# Patient Record
Sex: MALE | Race: WHITE | NOT HISPANIC OR LATINO | URBAN - METROPOLITAN AREA
[De-identification: names, ages, dates, MRNs, and addresses within clinical notes are randomized per-mention and may not be internally consistent; named-entity substitution may affect disease eponyms.]

---

## 2021-03-05 ENCOUNTER — LAB OUTSIDE AN ENCOUNTER (OUTPATIENT)
Dept: URBAN - METROPOLITAN AREA CLINIC 82 | Facility: CLINIC | Age: 61
End: 2021-03-05

## 2021-03-05 ENCOUNTER — OFFICE VISIT (OUTPATIENT)
Dept: URBAN - METROPOLITAN AREA CLINIC 82 | Facility: CLINIC | Age: 61
End: 2021-03-05
Payer: COMMERCIAL

## 2021-03-05 DIAGNOSIS — R19.7 DIARRHEA, UNSPECIFIED: ICD-10-CM

## 2021-03-05 DIAGNOSIS — K92.1 HEMATOCHEZIA: ICD-10-CM

## 2021-03-05 DIAGNOSIS — L98.9 SKIN LESIONS: ICD-10-CM

## 2021-03-05 DIAGNOSIS — R10.9 ABDOMINAL PAIN: ICD-10-CM

## 2021-03-05 DIAGNOSIS — R16.0 HEPATOMEGALY: ICD-10-CM

## 2021-03-05 DIAGNOSIS — R63.4 WEIGHT LOSS: ICD-10-CM

## 2021-03-05 PROCEDURE — G8420 CALC BMI NORM PARAMETERS: HCPCS | Performed by: INTERNAL MEDICINE

## 2021-03-05 PROCEDURE — G9902 PT SCRN TBCO AND ID AS USER: HCPCS | Performed by: INTERNAL MEDICINE

## 2021-03-05 PROCEDURE — 99214 OFFICE O/P EST MOD 30 MIN: CPT | Performed by: INTERNAL MEDICINE

## 2021-03-05 PROCEDURE — G8427 DOCREV CUR MEDS BY ELIG CLIN: HCPCS | Performed by: INTERNAL MEDICINE

## 2021-03-05 RX ORDER — DICYCLOMINE HYDROCHLORIDE 10 MG/1
1 TABLET CAPSULE ORAL THREE TIMES A DAY
Qty: 90 | Refills: 1 | OUTPATIENT
Start: 2021-03-05 | End: 2021-05-04

## 2021-03-05 RX ORDER — SODIUM, POTASSIUM,MAG SULFATES 17.5-3.13G
TAKE 325 ML SOLUTION, RECONSTITUTED, ORAL ORAL ONCE
Qty: 1 | Refills: 0 | OUTPATIENT
Start: 2021-03-05 | End: 2021-03-06

## 2021-03-05 RX ORDER — CHOLESTYRAMINE 4 G/9G
1 PACKET MIXED WITH WATER OR NON-CARBONATED DRINK POWDER, FOR SUSPENSION ORAL TWICE A DAY
Qty: 60 | Refills: 1 | OUTPATIENT
Start: 2021-03-05

## 2021-03-05 NOTE — HPI-OTHER HISTORIES
The patient is a 59 year old /White male, who presents on referral from Maegan Crockett MD, for a gastroenterology evaluation for liver concerns. A copy of this document will be sent to the referring provider. The patient reports no significant symptoms related to the elevated LFT's.  The patient relates no significant family or personal history of liver disease. He states a history of mild alcohol use. The patient reports a personal history of no other habits that could cause liver damage.  Interval testing has not been done.     01/08/2020 Patient states he went to his dermatologist to check blisters on his hands, and suspects it has to do with liver. Patient reports donating blood in the past in the 80's and was suspended from blood donation because he had antibodies. Denies any lesions on the legs. Denies any jaundice. Denies any history of hepatitis in the past.  01/27/2020 Labs showed anti-smooth muscle antibodies positive, negative Hepatitis panel. RUQ US done on 01/08/2020 showed hepatomegaly.  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 Denies any jaundice, ascites or pedal edema. Denies any abdominal pain. Still having lesion in left dorsal area of the hand.  02/20/2020 Liver biopsy done on 02/04/2020 showed minimal portal inflammation, no fatty liver, no fibrosis.  Patient denies any consumption of natural or herbal supplements. He has not seen the dermatologist. He states the lesions won't return until the summer time. He states he rarely drinks alcohol. He did have some pain after the liver biopsy at liver bx site. He reports taking Vitamin B12 shots. He has not has B12 level checked. Denies any melena or hematochezia.

## 2021-03-05 NOTE — HPI-TODAY'S VISIT:
03/05/2021 Patient presents on referral from Dr. Maegan Crockett for colon cancer screening. Colonoscopy in 2017 showed polyps in the ascending colon, transverse colon, and sigmoid colon. Advised him to have repeat colonoscopy in 3 years. Biopsy showed tubular adenomas. Patient c/o of abdominal pain for severl months, black stool. He currently takes Narco. No family history of colitis. Patient lost about 30lbs over past 6 months, but appetite is normal. His bowel movement occur 3-4 times a day, intermittent bloody stool. His mother passed away from stomach cancer which metastisized. Some tenderness in LUQ. His cardiologist doubled his Porotonix dosage.

## 2021-04-09 ENCOUNTER — OFFICE VISIT (OUTPATIENT)
Dept: URBAN - METROPOLITAN AREA SURGERY CENTER 13 | Facility: SURGERY CENTER | Age: 61
End: 2021-04-09
Payer: COMMERCIAL

## 2021-04-09 DIAGNOSIS — Z86.010 H/O ADENOMATOUS POLYP OF COLON: ICD-10-CM

## 2021-04-09 DIAGNOSIS — K22.2 ACQUIRED ESOPHAGEAL RING: ICD-10-CM

## 2021-04-09 DIAGNOSIS — K63.89 BACTERIAL OVERGROWTH SYNDROME: ICD-10-CM

## 2021-04-09 DIAGNOSIS — R63.4 ABNORMAL INTENTIONAL WEIGHT LOSS: ICD-10-CM

## 2021-04-09 DIAGNOSIS — K22.8 COLUMNAR-LINED ESOPHAGUS: ICD-10-CM

## 2021-04-09 DIAGNOSIS — R10.13 ABDOMINAL DISCOMFORT, EPIGASTRIC: ICD-10-CM

## 2021-04-09 DIAGNOSIS — K31.89 ACQUIRED DEFORMITY OF DUODENUM: ICD-10-CM

## 2021-04-09 PROCEDURE — 45380 COLONOSCOPY AND BIOPSY: CPT | Performed by: INTERNAL MEDICINE

## 2021-04-09 PROCEDURE — G8907 PT DOC NO EVENTS ON DISCHARG: HCPCS | Performed by: INTERNAL MEDICINE

## 2021-04-09 PROCEDURE — 43239 EGD BIOPSY SINGLE/MULTIPLE: CPT | Performed by: INTERNAL MEDICINE

## 2021-04-09 PROCEDURE — 43249 ESOPH EGD DILATION <30 MM: CPT | Performed by: INTERNAL MEDICINE

## 2021-05-10 ENCOUNTER — OFFICE VISIT (OUTPATIENT)
Dept: URBAN - NONMETROPOLITAN AREA CLINIC 4 | Facility: CLINIC | Age: 61
End: 2021-05-10

## 2021-05-10 RX ORDER — CHOLESTYRAMINE 4 G/9G
1 PACKET MIXED WITH WATER OR NON-CARBONATED DRINK POWDER, FOR SUSPENSION ORAL TWICE A DAY
Qty: 60 | Refills: 1 | COMMUNITY

## 2023-12-08 ENCOUNTER — OFFICE VISIT (OUTPATIENT)
Dept: URBAN - METROPOLITAN AREA CLINIC 54 | Facility: CLINIC | Age: 63
End: 2023-12-08
Payer: COMMERCIAL

## 2023-12-08 VITALS
WEIGHT: 157 LBS | HEIGHT: 72 IN | DIASTOLIC BLOOD PRESSURE: 90 MMHG | TEMPERATURE: 97.4 F | BODY MASS INDEX: 21.26 KG/M2 | HEART RATE: 64 BPM | SYSTOLIC BLOOD PRESSURE: 121 MMHG

## 2023-12-08 DIAGNOSIS — L98.9 SKIN LESIONS: ICD-10-CM

## 2023-12-08 DIAGNOSIS — R16.0 HEPATOMEGALY: ICD-10-CM

## 2023-12-08 DIAGNOSIS — R19.7 DIARRHEA, UNSPECIFIED: ICD-10-CM

## 2023-12-08 DIAGNOSIS — R10.9 ABDOMINAL PAIN: ICD-10-CM

## 2023-12-08 PROCEDURE — 99214 OFFICE O/P EST MOD 30 MIN: CPT | Performed by: PHYSICIAN ASSISTANT

## 2023-12-08 RX ORDER — MELOXICAM 15 MG/1
1 TABLET TABLET ORAL ONCE A DAY
Status: ACTIVE | COMMUNITY

## 2023-12-08 RX ORDER — ALBUTEROL SULFATE 108 UG/1
1 PUFF AS NEEDED AEROSOL, METERED RESPIRATORY (INHALATION)
Status: ACTIVE | COMMUNITY

## 2023-12-08 RX ORDER — LEVOTHYROXINE SODIUM 137 UG/1
1 TABLET IN THE MORNING ON AN EMPTY STOMACH TABLET ORAL ONCE A DAY
Status: ACTIVE | COMMUNITY

## 2023-12-08 RX ORDER — BACLOFEN 10 MG/1
1 TABLET AS NEEDED TABLET ORAL TWICE A DAY
Status: ACTIVE | COMMUNITY

## 2023-12-08 RX ORDER — ALPRAZOLAM 0.5 MG/1
1 TABLET TABLET ORAL TWICE A DAY
Status: ACTIVE | COMMUNITY

## 2023-12-08 RX ORDER — ONDANSETRON 4 MG/1
1 TABLET ON THE TONGUE AND ALLOW TO DISSOLVE TABLET, ORALLY DISINTEGRATING ORAL ONCE A DAY
Status: ACTIVE | COMMUNITY

## 2023-12-08 RX ORDER — METOPROLOL SUCCINATE 50 MG/1
1 TABLET TABLET, FILM COATED, EXTENDED RELEASE ORAL ONCE A DAY
Status: ACTIVE | COMMUNITY

## 2023-12-08 RX ORDER — PREGABALIN 150 MG/1
1 CAPSULE CAPSULE ORAL ONCE A DAY
Status: ACTIVE | COMMUNITY

## 2023-12-08 RX ORDER — NITROGLYCERIN 0.4 MG/1
AS DIRECTED TABLET SUBLINGUAL
Status: ACTIVE | COMMUNITY

## 2023-12-08 RX ORDER — DICYCLOMINE HYDROCHLORIDE 20 MG/1
1 TABLET TABLET ORAL THREE TIMES A DAY
Qty: 90 | Refills: 3 | OUTPATIENT
Start: 2023-12-08 | End: 2024-04-06

## 2023-12-08 RX ORDER — RIZATRIPTAN BENZOATE 10 MG/1
1 TABLET TABLET ORAL ONCE A DAY
Status: ACTIVE | COMMUNITY

## 2023-12-08 RX ORDER — FLUTICASONE FUROATE, UMECLIDINIUM BROMIDE AND VILANTEROL TRIFENATATE 100; 62.5; 25 UG/1; UG/1; UG/1
1 PUFF POWDER RESPIRATORY (INHALATION) ONCE A DAY
Status: ACTIVE | COMMUNITY

## 2023-12-08 RX ORDER — HYDROCODONE BITARTRATE AND ACETAMINOPHEN 5; 325 MG/1; MG/1
1 TABLET AS NEEDED TABLET ORAL
Status: ACTIVE | COMMUNITY

## 2023-12-08 NOTE — PHYSICAL EXAM GASTROINTESTINAL
Abdomen , soft, diffuse tenderness to palpation, nondistended , no guarding or rigidity , no masses palpable ,

## 2023-12-08 NOTE — HPI-OTHER HISTORIES
The patient is a 59 year old /White male, who presents on referral from Maegan Crockett MD, for a gastroenterology evaluation for liver concerns. A copy of this document will be sent to the referring provider. The patient reports no significant symptoms related to the elevated LFT's.  The patient relates no significant family or personal history of liver disease. He states a history of mild alcohol use. The patient reports a personal history of no other habits that could cause liver damage.  Interval testing has not been done.     01/08/2020 Patient states he went to his dermatologist to check blisters on his hands, and suspects it has to do with liver. Patient reports donating blood in the past in the 80's and was suspended from blood donation because he had antibodies. Denies any lesions on the legs. Denies any jaundice. Denies any history of hepatitis in the past.  01/27/2020 Labs showed anti-smooth muscle antibodies positive, negative Hepatitis panel. RUQ US done on 01/08/2020 showed hepatomegaly.  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 Denies any jaundice, ascites or pedal edema. Denies any abdominal pain. Still having lesion in left dorsal area of the hand.  02/20/2020 Liver biopsy done on 02/04/2020 showed minimal portal inflammation, no fatty liver, no fibrosis.  Patient denies any consumption of natural or herbal supplements. He has not seen the dermatologist. He states the lesions won't return until the summer time. He states he rarely drinks alcohol. He did have some pain after the liver biopsy at liver bx site. He reports taking Vitamin B12 shots. He has not has B12 level checked. Denies any melena or hematochezia.  03/05/2021 Patient presents on referral from Dr. Maegan Crockett for colon cancer screening. Colonoscopy in 2017 showed polyps in the ascending colon, transverse colon, and sigmoid colon. Advised him to have repeat colonoscopy in 3 years. Biopsy showed tubular adenomas. Patient c/o of abdominal pain for severl months, black stool. He currently takes Narco. No family history of colitis. Patient lost about 30lbs over past 6 months, but appetite is normal. His bowel movement occur 3-4 times a day, intermittent bloody stool. His mother passed away from stomach cancer which metastisized. Some tenderness in LUQ. His cardiologist doubled his Porotonix dosage.

## 2023-12-08 NOTE — HPI-TODAY'S VISIT:
12/8/23: Patient presents for routine follow up. Patient underwent EGD and colonoscopy in April 2021 with Dr. Garcia. EGD was performed for epigastric pain and weight loss that showed LA grade A reflux esophagitis with widely patent Schatzki's ring. Acute gastritis and duodenitis. Colonoscopy showed hemorrhoids on perianal exam and inflamed mucosa in the sigmoid colon, descending colon, and at the hepatic flexure and biopsied. EGD and colon biopsies were benign. Repeat colonoscopy in 5 years for surveillance.   Today, patient still complains of generalized abdominal pain and cramping daily. Reports 4-5 BMs daily with varying consistencies and mucus. Patient is concerned about parasites. Does not take anything for abdominal pain. No NSAIDs or EtOH. Daily tobacco and marijuana use.   Recent outside labs were unremarkable. CT A/P with contrast from 7/2023 for LLQ and lower back pain showed no acute abdominal or pelvic abnormalities. He is s/p back surgery in April with Dr Alexander and most recently in Nov with Dr Cohen.

## 2023-12-11 LAB
(TTG) AB, IGA: <1
(TTG) AB, IGG: <1
ANTIGLIADIN ABS, IGA: <1
GLIADIN (DEAMIDATED) AB (IGG): <1
IMMUNOGLOBULIN A: 138

## 2023-12-21 LAB
CULTURE: (no result)
OVA AND PARASITES, CONC AND PERM SMEAR: (no result)
PANCREATIC ELASTASE, FECAL: >500

## 2023-12-22 ENCOUNTER — TELEPHONE ENCOUNTER (OUTPATIENT)
Dept: URBAN - METROPOLITAN AREA CLINIC 54 | Facility: CLINIC | Age: 63
End: 2023-12-22

## 2024-02-09 ENCOUNTER — OV EP (OUTPATIENT)
Dept: URBAN - METROPOLITAN AREA CLINIC 54 | Facility: CLINIC | Age: 64
End: 2024-02-09
Payer: COMMERCIAL

## 2024-02-09 VITALS
HEIGHT: 72 IN | BODY MASS INDEX: 22 KG/M2 | WEIGHT: 162.4 LBS | SYSTOLIC BLOOD PRESSURE: 129 MMHG | DIASTOLIC BLOOD PRESSURE: 82 MMHG | TEMPERATURE: 97.4 F | HEART RATE: 50 BPM

## 2024-02-09 DIAGNOSIS — R16.0 HEPATOMEGALY: ICD-10-CM

## 2024-02-09 DIAGNOSIS — R10.9 ABDOMINAL PAIN: ICD-10-CM

## 2024-02-09 DIAGNOSIS — R19.7 DIARRHEA, UNSPECIFIED: ICD-10-CM

## 2024-02-09 DIAGNOSIS — L98.9 SKIN LESIONS: ICD-10-CM

## 2024-02-09 PROCEDURE — 99214 OFFICE O/P EST MOD 30 MIN: CPT | Performed by: PHYSICIAN ASSISTANT

## 2024-02-09 RX ORDER — ONDANSETRON 4 MG/1
1 TABLET ON THE TONGUE AND ALLOW TO DISSOLVE TABLET, ORALLY DISINTEGRATING ORAL ONCE A DAY
Status: ACTIVE | COMMUNITY

## 2024-02-09 RX ORDER — PREGABALIN 150 MG/1
1 CAPSULE CAPSULE ORAL ONCE A DAY
Status: ACTIVE | COMMUNITY

## 2024-02-09 RX ORDER — RIZATRIPTAN BENZOATE 10 MG/1
1 TABLET TABLET ORAL ONCE A DAY
Status: ACTIVE | COMMUNITY

## 2024-02-09 RX ORDER — ALBUTEROL SULFATE 108 UG/1
1 PUFF AS NEEDED AEROSOL, METERED RESPIRATORY (INHALATION)
Status: ACTIVE | COMMUNITY

## 2024-02-09 RX ORDER — ALPRAZOLAM 0.5 MG/1
1 TABLET TABLET ORAL TWICE A DAY
Status: ACTIVE | COMMUNITY

## 2024-02-09 RX ORDER — NITROGLYCERIN 0.4 MG/1
AS DIRECTED TABLET SUBLINGUAL
Status: ACTIVE | COMMUNITY

## 2024-02-09 RX ORDER — HYDROCODONE BITARTRATE AND ACETAMINOPHEN 5; 325 MG/1; MG/1
1 TABLET AS NEEDED TABLET ORAL
Status: ACTIVE | COMMUNITY

## 2024-02-09 RX ORDER — BACLOFEN 10 MG/1
1 TABLET AS NEEDED TABLET ORAL TWICE A DAY
Status: ACTIVE | COMMUNITY

## 2024-02-09 RX ORDER — MELOXICAM 15 MG/1
1 TABLET TABLET ORAL ONCE A DAY
Status: ACTIVE | COMMUNITY

## 2024-02-09 RX ORDER — HYOSCYAMINE SULFATE 0.12 MG/1
1 TABLET AS NEEDED TABLET ORAL THREE TIMES A DAY
Qty: 90 TABLET | Refills: 3 | OUTPATIENT
Start: 2024-02-09 | End: 2024-06-08

## 2024-02-09 RX ORDER — LEVOTHYROXINE SODIUM 137 UG/1
1 TABLET IN THE MORNING ON AN EMPTY STOMACH TABLET ORAL ONCE A DAY
Status: ACTIVE | COMMUNITY

## 2024-02-09 RX ORDER — METOPROLOL SUCCINATE 50 MG/1
1 TABLET TABLET, FILM COATED, EXTENDED RELEASE ORAL ONCE A DAY
Status: ACTIVE | COMMUNITY

## 2024-02-09 RX ORDER — FLUTICASONE FUROATE, UMECLIDINIUM BROMIDE AND VILANTEROL TRIFENATATE 100; 62.5; 25 UG/1; UG/1; UG/1
1 PUFF POWDER RESPIRATORY (INHALATION) ONCE A DAY
Status: ACTIVE | COMMUNITY

## 2024-02-09 RX ORDER — METHOCARBAMOL 750 MG/1
1 TABLET TABLET ORAL
Status: ACTIVE | COMMUNITY

## 2024-02-09 NOTE — PHYSICAL EXAM CONSTITUTIONAL:
well developed, chronically ill appearing, in no acute distress, ambulating without difficulty, normal communication ability

## 2024-02-09 NOTE — HPI-OTHER HISTORIES
The patient is a 59 year old /White male, who presents on referral from Maegan Crockett MD, for a gastroenterology evaluation for liver concerns. A copy of this document will be sent to the referring provider. The patient reports no significant symptoms related to the elevated LFT's.  The patient relates no significant family or personal history of liver disease. He states a history of mild alcohol use. The patient reports a personal history of no other habits that could cause liver damage.  Interval testing has not been done.     01/08/2020 Patient states he went to his dermatologist to check blisters on his hands, and suspects it has to do with liver. Patient reports donating blood in the past in the 80's and was suspended from blood donation because he had antibodies. Denies any lesions on the legs. Denies any jaundice. Denies any history of hepatitis in the past.  01/27/2020 Labs showed anti-smooth muscle antibodies positive, negative Hepatitis panel. RUQ US done on 01/08/2020 showed hepatomegaly.  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 Denies any jaundice, ascites or pedal edema. Denies any abdominal pain. Still having lesion in left dorsal area of the hand.  02/20/2020 Liver biopsy done on 02/04/2020 showed minimal portal inflammation, no fatty liver, no fibrosis.  Patient denies any consumption of natural or herbal supplements. He has not seen the dermatologist. He states the lesions won't return until the summer time. He states he rarely drinks alcohol. He did have some pain after the liver biopsy at liver bx site. He reports taking Vitamin B12 shots. He has not has B12 level checked. Denies any melena or hematochezia.  03/05/2021 Patient presents on referral from Dr. Maegan Crockett for colon cancer screening. Colonoscopy in 2017 showed polyps in the ascending colon, transverse colon, and sigmoid colon. Advised him to have repeat colonoscopy in 3 years. Biopsy showed tubular adenomas. Patient c/o of abdominal pain for severl months, black stool. He currently takes Narco. No family history of colitis. Patient lost about 30lbs over past 6 months, but appetite is normal. His bowel movement occur 3-4 times a day, intermittent bloody stool. His mother passed away from stomach cancer which metastisized. Some tenderness in LUQ. His cardiologist doubled his Porotonix dosage.  12/8/23: Patient presents for routine follow up. Patient underwent EGD and colonoscopy in April 2021 with Dr. Garcia. EGD was performed for epigastric pain and weight loss that showed LA grade A reflux esophagitis with widely patent Schatzki's ring. Acute gastritis and duodenitis. Colonoscopy showed hemorrhoids on perianal exam and inflamed mucosa in the sigmoid colon, descending colon, and at the hepatic flexure and biopsied. EGD and colon biopsies were benign. Repeat colonoscopy in 5 years for surveillance.   Today, patient still complains of generalized abdominal pain and cramping daily. Reports 4-5 BMs daily with varying consistencies and mucus. Patient is concerned about parasites. Does not take anything for abdominal pain. No NSAIDs or EtOH. Daily tobacco and marijuana use.  Recent outside labs were unremarkable. CT A/P with contrast from 7/2023 for LLQ and lower back pain showed no acute abdominal or pelvic abnormalities. He is s/p back surgery in April with Dr Alexander and most recently in Nov with Dr Cohen.

## 2024-02-09 NOTE — HPI-TODAY'S VISIT:
2/9/24: Patient presents for routine follow up. Patient he trialed dicyclomine three times daily x 1 month without improvement. Still with daily diffuse abdominal cramping. Stool studies for diarrhea were negative. Patient with 3-4 BMs daily. Still with daily MJ use.

## 2024-05-10 ENCOUNTER — OFFICE VISIT (OUTPATIENT)
Dept: URBAN - METROPOLITAN AREA CLINIC 54 | Facility: CLINIC | Age: 64
End: 2024-05-10
Payer: COMMERCIAL

## 2024-05-10 ENCOUNTER — LAB OUTSIDE AN ENCOUNTER (OUTPATIENT)
Dept: URBAN - METROPOLITAN AREA CLINIC 54 | Facility: CLINIC | Age: 64
End: 2024-05-10

## 2024-05-10 ENCOUNTER — DASHBOARD ENCOUNTERS (OUTPATIENT)
Age: 64
End: 2024-05-10

## 2024-05-10 VITALS
TEMPERATURE: 97.2 F | BODY MASS INDEX: 23.19 KG/M2 | HEART RATE: 51 BPM | DIASTOLIC BLOOD PRESSURE: 82 MMHG | SYSTOLIC BLOOD PRESSURE: 142 MMHG | HEIGHT: 72 IN | WEIGHT: 171.2 LBS

## 2024-05-10 DIAGNOSIS — K83.8 COMMON BILE DUCT DILATION: ICD-10-CM

## 2024-05-10 DIAGNOSIS — R10.9 ABDOMINAL PAIN: ICD-10-CM

## 2024-05-10 DIAGNOSIS — L98.9 SKIN LESIONS: ICD-10-CM

## 2024-05-10 DIAGNOSIS — R16.0 HEPATOMEGALY: ICD-10-CM

## 2024-05-10 DIAGNOSIS — R19.7 DIARRHEA, UNSPECIFIED: ICD-10-CM

## 2024-05-10 PROCEDURE — 99214 OFFICE O/P EST MOD 30 MIN: CPT | Performed by: PHYSICIAN ASSISTANT

## 2024-05-10 RX ORDER — MELOXICAM 15 MG/1
1 TABLET TABLET ORAL ONCE A DAY
Status: ACTIVE | COMMUNITY

## 2024-05-10 RX ORDER — HYOSCYAMINE SULFATE 0.12 MG/1
1 TABLET AS NEEDED TABLET ORAL THREE TIMES A DAY
Qty: 90 TABLET | Refills: 3 | Status: ACTIVE | COMMUNITY
Start: 2024-02-09 | End: 2024-06-08

## 2024-05-10 RX ORDER — NITROGLYCERIN 0.4 MG/1
AS DIRECTED TABLET SUBLINGUAL
Status: ACTIVE | COMMUNITY

## 2024-05-10 RX ORDER — LEVOTHYROXINE SODIUM 137 UG/1
1 TABLET IN THE MORNING ON AN EMPTY STOMACH TABLET ORAL ONCE A DAY
Status: ACTIVE | COMMUNITY

## 2024-05-10 RX ORDER — RIZATRIPTAN BENZOATE 10 MG/1
1 TABLET TABLET ORAL ONCE A DAY
Status: ACTIVE | COMMUNITY

## 2024-05-10 RX ORDER — FLUTICASONE FUROATE, UMECLIDINIUM BROMIDE AND VILANTEROL TRIFENATATE 100; 62.5; 25 UG/1; UG/1; UG/1
1 PUFF POWDER RESPIRATORY (INHALATION) ONCE A DAY
Status: ACTIVE | COMMUNITY

## 2024-05-10 RX ORDER — ALPRAZOLAM 0.5 MG/1
1 TABLET TABLET ORAL TWICE A DAY
Status: ACTIVE | COMMUNITY

## 2024-05-10 RX ORDER — PREGABALIN 150 MG/1
1 CAPSULE CAPSULE ORAL ONCE A DAY
Status: ACTIVE | COMMUNITY

## 2024-05-10 RX ORDER — ALBUTEROL SULFATE 108 UG/1
1 PUFF AS NEEDED AEROSOL, METERED RESPIRATORY (INHALATION)
Status: ACTIVE | COMMUNITY

## 2024-05-10 RX ORDER — COLESEVELAM HYDROCHLORIDE 625 MG/1
3 TABLETS WITH MEALS TABLET, COATED ORAL TWICE A DAY
Qty: 180 | Refills: 3 | OUTPATIENT
Start: 2024-05-10

## 2024-05-10 RX ORDER — METHOCARBAMOL 750 MG/1
1 TABLET TABLET ORAL
Status: ACTIVE | COMMUNITY

## 2024-05-10 RX ORDER — METOPROLOL SUCCINATE 50 MG/1
1 TABLET TABLET, FILM COATED, EXTENDED RELEASE ORAL ONCE A DAY
Status: ACTIVE | COMMUNITY

## 2024-05-10 RX ORDER — HYDROCODONE BITARTRATE AND ACETAMINOPHEN 5; 325 MG/1; MG/1
1 TABLET AS NEEDED TABLET ORAL
Status: ACTIVE | COMMUNITY

## 2024-05-10 RX ORDER — ONDANSETRON 4 MG/1
1 TABLET ON THE TONGUE AND ALLOW TO DISSOLVE TABLET, ORALLY DISINTEGRATING ORAL ONCE A DAY
Status: ACTIVE | COMMUNITY

## 2024-05-10 RX ORDER — BACLOFEN 10 MG/1
1 TABLET AS NEEDED TABLET ORAL TWICE A DAY
Status: ACTIVE | COMMUNITY

## 2024-06-21 ENCOUNTER — OFFICE VISIT (OUTPATIENT)
Dept: URBAN - METROPOLITAN AREA CLINIC 54 | Facility: CLINIC | Age: 64
End: 2024-06-21
Payer: COMMERCIAL

## 2024-06-21 VITALS
BODY MASS INDEX: 22.7 KG/M2 | HEIGHT: 72 IN | DIASTOLIC BLOOD PRESSURE: 101 MMHG | TEMPERATURE: 98.1 F | WEIGHT: 167.6 LBS | SYSTOLIC BLOOD PRESSURE: 147 MMHG | HEART RATE: 55 BPM

## 2024-06-21 DIAGNOSIS — R19.7 DIARRHEA, UNSPECIFIED: ICD-10-CM

## 2024-06-21 DIAGNOSIS — R16.0 HEPATOMEGALY: ICD-10-CM

## 2024-06-21 DIAGNOSIS — K83.8 COMMON BILE DUCT DILATION: ICD-10-CM

## 2024-06-21 DIAGNOSIS — R10.84 ABDOMINAL CRAMPING, GENERALIZED: ICD-10-CM

## 2024-06-21 PROCEDURE — 99213 OFFICE O/P EST LOW 20 MIN: CPT | Performed by: PHYSICIAN ASSISTANT

## 2024-06-21 RX ORDER — METOPROLOL SUCCINATE 50 MG/1
1 TABLET TABLET, FILM COATED, EXTENDED RELEASE ORAL ONCE A DAY
Status: ACTIVE | COMMUNITY

## 2024-06-21 RX ORDER — ALPRAZOLAM 0.5 MG/1
1 TABLET TABLET ORAL TWICE A DAY
Status: ACTIVE | COMMUNITY

## 2024-06-21 RX ORDER — HYDROCODONE BITARTRATE AND ACETAMINOPHEN 5; 325 MG/1; MG/1
1 TABLET AS NEEDED TABLET ORAL
Status: ACTIVE | COMMUNITY

## 2024-06-21 RX ORDER — PREGABALIN 150 MG/1
1 CAPSULE CAPSULE ORAL ONCE A DAY
Status: ACTIVE | COMMUNITY

## 2024-06-21 RX ORDER — COLESEVELAM HYDROCHLORIDE 625 MG/1
3 TABLETS WITH MEALS TABLET, COATED ORAL TWICE A DAY
Qty: 180 | Refills: 3 | Status: ACTIVE | COMMUNITY
Start: 2024-05-10

## 2024-06-21 RX ORDER — LEVOTHYROXINE SODIUM 137 UG/1
1 TABLET IN THE MORNING ON AN EMPTY STOMACH TABLET ORAL ONCE A DAY
Status: ACTIVE | COMMUNITY

## 2024-06-21 RX ORDER — BACLOFEN 10 MG/1
1 TABLET AS NEEDED TABLET ORAL TWICE A DAY
Status: ACTIVE | COMMUNITY

## 2024-06-21 RX ORDER — NITROGLYCERIN 0.4 MG/1
AS DIRECTED TABLET SUBLINGUAL
Status: ACTIVE | COMMUNITY

## 2024-06-21 RX ORDER — RIZATRIPTAN BENZOATE 10 MG/1
1 TABLET TABLET ORAL ONCE A DAY
Status: ACTIVE | COMMUNITY

## 2024-06-21 RX ORDER — ALBUTEROL SULFATE 108 UG/1
1 PUFF AS NEEDED AEROSOL, METERED RESPIRATORY (INHALATION)
Status: ACTIVE | COMMUNITY

## 2024-06-21 RX ORDER — ONDANSETRON 4 MG/1
1 TABLET ON THE TONGUE AND ALLOW TO DISSOLVE TABLET, ORALLY DISINTEGRATING ORAL ONCE A DAY
Status: ACTIVE | COMMUNITY

## 2024-06-21 RX ORDER — MELOXICAM 15 MG/1
1 TABLET TABLET ORAL ONCE A DAY
Status: ACTIVE | COMMUNITY

## 2024-06-21 NOTE — HPI-OTHER HISTORIES
The patient is a 59 year old /White male, who presents on referral from Maegan Crockett MD, for a gastroenterology evaluation for liver concerns. A copy of this document will be sent to the referring provider. The patient reports no significant symptoms related to the elevated LFT's.  The patient relates no significant family or personal history of liver disease. He states a history of mild alcohol use. The patient reports a personal history of no other habits that could cause liver damage.  Interval testing has not been done.     01/08/2020 Patient states he went to his dermatologist to check blisters on his hands, and suspects it has to do with liver. Patient reports donating blood in the past in the 80's and was suspended from blood donation because he had antibodies. Denies any lesions on the legs. Denies any jaundice. Denies any history of hepatitis in the past.  01/27/2020 Labs showed anti-smooth muscle antibodies positive, negative Hepatitis panel. RUQ US done on 01/08/2020 showed hepatomegaly.  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 Denies any jaundice, ascites or pedal edema. Denies any abdominal pain. Still having lesion in left dorsal area of the hand.  02/20/2020 Liver biopsy done on 02/04/2020 showed minimal portal inflammation, no fatty liver, no fibrosis.  Patient denies any consumption of natural or herbal supplements. He has not seen the dermatologist. He states the lesions won't return until the summer time. He states he rarely drinks alcohol. He did have some pain after the liver biopsy at liver bx site. He reports taking Vitamin B12 shots. He has not has B12 level checked. Denies any melena or hematochezia.  03/05/2021 Patient presents on referral from Dr. Maegan Crockett for colon cancer screening. Colonoscopy in 2017 showed polyps in the ascending colon, transverse colon, and sigmoid colon. Advised him to have repeat colonoscopy in 3 years. Biopsy showed tubular adenomas. Patient c/o of abdominal pain for severl months, black stool. He currently takes Narco. No family history of colitis. Patient lost about 30lbs over past 6 months, but appetite is normal. His bowel movement occur 3-4 times a day, intermittent bloody stool. His mother passed away from stomach cancer which metastisized. Some tenderness in LUQ. His cardiologist doubled his Porotonix dosage.  12/8/23: Patient presents for routine follow up. Patient underwent EGD and colonoscopy in April 2021 with Dr. Garcia. EGD was performed for epigastric pain and weight loss that showed LA grade A reflux esophagitis with widely patent Schatzki's ring. Acute gastritis and duodenitis. Colonoscopy showed hemorrhoids on perianal exam and inflamed mucosa in the sigmoid colon, descending colon, and at the hepatic flexure and biopsied. EGD and colon biopsies were benign. Repeat colonoscopy in 5 years for surveillance.   Today, patient still complains of generalized abdominal pain and cramping daily. Reports 4-5 BMs daily with varying consistencies and mucus. Patient is concerned about parasites. Does not take anything for abdominal pain. No NSAIDs or EtOH. Daily tobacco and marijuana use.  Recent outside labs were unremarkable. CT A/P with contrast from 7/2023 for LLQ and lower back pain showed no acute abdominal or pelvic abnormalities. He is s/p back surgery in April with Dr Alexander and most recently in Nov with Dr Cohen.  2/9/24: Patient presents for routine follow up. Patient he trialed dicyclomine three times daily x 1 month without improvement. Still with daily diffuse abdominal cramping. Stool studies for diarrhea were negative. Patient with 3-4 BMs daily. Still with daily MJ use.  5/10/24: Patient present for routine follow-up.  He reports mild improvement with Levsin but only takes this once daily prior to his only meal.  Abdominal cramping still occurs daily.  He still has 3-5 loose to watery BMs daily.  He was recently at [facility] on 3/28/2024 for new onset RUQ pain that radiated through to his back.  Normal LFTs and lipase.  CT A/P showed CBD dilation at 16 mm s/p CCY and moderate patchy atelectasis vs consolidation at the bilateral lung bases.  Of note, within the last week, patient's wife was diagnosed with breast cancer and his brother was diagnosed with pancreatic cancer.  6/21/24: Patient presents for routine follow-up. MRI abdomen/MRCP reviewed.  Tortuous PD without dilation or concerning features.  CBD measured 9 mm in the setting of CCY.  No choledocholithiasis or masses noted.  He discontinued Levsin and just started WelChol 5 days ago with some improvement in diarrhea.

## 2024-09-20 ENCOUNTER — LAB OUTSIDE AN ENCOUNTER (OUTPATIENT)
Dept: URBAN - METROPOLITAN AREA CLINIC 54 | Facility: CLINIC | Age: 64
End: 2024-09-20

## 2024-09-20 ENCOUNTER — OFFICE VISIT (OUTPATIENT)
Dept: URBAN - METROPOLITAN AREA CLINIC 54 | Facility: CLINIC | Age: 64
End: 2024-09-20
Payer: COMMERCIAL

## 2024-09-20 VITALS
DIASTOLIC BLOOD PRESSURE: 91 MMHG | BODY MASS INDEX: 22.11 KG/M2 | HEART RATE: 63 BPM | WEIGHT: 163.2 LBS | SYSTOLIC BLOOD PRESSURE: 125 MMHG | HEIGHT: 72 IN

## 2024-09-20 DIAGNOSIS — R10.9 ABDOMINAL PAIN: ICD-10-CM

## 2024-09-20 DIAGNOSIS — K83.8 COMMON BILE DUCT DILATION: ICD-10-CM

## 2024-09-20 DIAGNOSIS — R13.19 CERVICAL DYSPHAGIA: ICD-10-CM

## 2024-09-20 DIAGNOSIS — L98.9 SKIN LESIONS: ICD-10-CM

## 2024-09-20 DIAGNOSIS — K92.1 MELENA: ICD-10-CM

## 2024-09-20 PROBLEM — 40739000: Status: ACTIVE | Noted: 2024-09-20

## 2024-09-20 PROCEDURE — 99214 OFFICE O/P EST MOD 30 MIN: CPT | Performed by: PHYSICIAN ASSISTANT

## 2024-09-20 RX ORDER — ALPRAZOLAM 0.5 MG/1
1 TABLET TABLET ORAL TWICE A DAY
COMMUNITY

## 2024-09-20 RX ORDER — HYDROCODONE BITARTRATE AND ACETAMINOPHEN 5; 325 MG/1; MG/1
1 TABLET AS NEEDED TABLET ORAL
COMMUNITY

## 2024-09-20 RX ORDER — COLESEVELAM HYDROCHLORIDE 625 MG/1
3 TABLETS WITH MEALS TABLET, COATED ORAL TWICE A DAY
Qty: 180 | Refills: 3 | COMMUNITY
Start: 2024-05-10

## 2024-09-20 RX ORDER — NITROGLYCERIN 0.4 MG/1
AS DIRECTED TABLET SUBLINGUAL
COMMUNITY

## 2024-09-20 RX ORDER — PREGABALIN 150 MG/1
1 CAPSULE CAPSULE ORAL ONCE A DAY
COMMUNITY

## 2024-09-20 RX ORDER — MELOXICAM 15 MG/1
1 TABLET TABLET ORAL ONCE A DAY
COMMUNITY

## 2024-09-20 RX ORDER — METOPROLOL SUCCINATE 50 MG/1
1 TABLET TABLET, FILM COATED, EXTENDED RELEASE ORAL ONCE A DAY
COMMUNITY

## 2024-09-20 RX ORDER — RIZATRIPTAN BENZOATE 10 MG/1
1 TABLET TABLET ORAL ONCE A DAY
COMMUNITY

## 2024-09-20 RX ORDER — BACLOFEN 10 MG/1
1 TABLET AS NEEDED TABLET ORAL TWICE A DAY
COMMUNITY

## 2024-09-20 RX ORDER — ONDANSETRON 4 MG/1
1 TABLET ON THE TONGUE AND ALLOW TO DISSOLVE TABLET, ORALLY DISINTEGRATING ORAL ONCE A DAY
COMMUNITY

## 2024-09-20 RX ORDER — ALBUTEROL SULFATE 108 UG/1
1 PUFF AS NEEDED AEROSOL, METERED RESPIRATORY (INHALATION)
COMMUNITY

## 2024-09-20 RX ORDER — LEVOTHYROXINE SODIUM 137 UG/1
1 TABLET IN THE MORNING ON AN EMPTY STOMACH TABLET ORAL ONCE A DAY
COMMUNITY

## 2024-09-20 NOTE — HPI-OTHER HISTORIES
The patient is a 59 year old /White male, who presents on referral from Maegan Crockett MD, for a gastroenterology evaluation for liver concerns. A copy of this document will be sent to the referring provider. The patient reports no significant symptoms related to the elevated LFT's.  The patient relates no significant family or personal history of liver disease. He states a history of mild alcohol use. The patient reports a personal history of no other habits that could cause liver damage.  Interval testing has not been done.     01/08/2020 Patient states he went to his dermatologist to check blisters on his hands, and suspects it has to do with liver. Patient reports donating blood in the past in the 80's and was suspended from blood donation because he had antibodies. Denies any lesions on the legs. Denies any jaundice. Denies any history of hepatitis in the past.  01/27/2020 Labs showed anti-smooth muscle antibodies positive, negative Hepatitis panel. RUQ US done on 01/08/2020 showed hepatomegaly.  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 Denies any jaundice, ascites or pedal edema. Denies any abdominal pain. Still having lesion in left dorsal area of the hand.  02/20/2020 Liver biopsy done on 02/04/2020 showed minimal portal inflammation, no fatty liver, no fibrosis.  Patient denies any consumption of natural or herbal supplements. He has not seen the dermatologist. He states the lesions won't return until the summer time. He states he rarely drinks alcohol. He did have some pain after the liver biopsy at liver bx site. He reports taking Vitamin B12 shots. He has not has B12 level checked. Denies any melena or hematochezia.  03/05/2021 Patient presents on referral from Dr. Maegan Crockett for colon cancer screening. Colonoscopy in 2017 showed polyps in the ascending colon, transverse colon, and sigmoid colon. Advised him to have repeat colonoscopy in 3 years. Biopsy showed tubular adenomas. Patient c/o of abdominal pain for severl months, black stool. He currently takes Narco. No family history of colitis. Patient lost about 30lbs over past 6 months, but appetite is normal. His bowel movement occur 3-4 times a day, intermittent bloody stool. His mother passed away from stomach cancer which metastisized. Some tenderness in LUQ. His cardiologist doubled his Porotonix dosage.  12/8/23: Patient presents for routine follow up. Patient underwent EGD and colonoscopy in April 2021 with Dr. Garcia. EGD was performed for epigastric pain and weight loss that showed LA grade A reflux esophagitis with widely patent Schatzki's ring. Acute gastritis and duodenitis. Colonoscopy showed hemorrhoids on perianal exam and inflamed mucosa in the sigmoid colon, descending colon, and at the hepatic flexure and biopsied. EGD and colon biopsies were benign. Repeat colonoscopy in 5 years for surveillance.   Today, patient still complains of generalized abdominal pain and cramping daily. Reports 4-5 BMs daily with varying consistencies and mucus. Patient is concerned about parasites. Does not take anything for abdominal pain. No NSAIDs or EtOH. Daily tobacco and marijuana use.  Recent outside labs were unremarkable. CT A/P with contrast from 7/2023 for LLQ and lower back pain showed no acute abdominal or pelvic abnormalities. He is s/p back surgery in April with Dr Alexander and most recently in Nov with Dr Cohen.  2/9/24: Patient presents for routine follow up. Patient he trialed dicyclomine three times daily x 1 month without improvement. Still with daily diffuse abdominal cramping. Stool studies for diarrhea were negative. Patient with 3-4 BMs daily. Still with daily MJ use.  5/10/24: Patient present for routine follow-up.  He reports mild improvement with Levsin but only takes this once daily prior to his only meal.  Abdominal cramping still occurs daily.  He still has 3-5 loose to watery BMs daily.  He was recently at [facility] on 3/28/2024 for new onset RUQ pain that radiated through to his back.  Normal LFTs and lipase.  CT A/P showed CBD dilation at 16 mm s/p CCY and moderate patchy atelectasis vs consolidation at the bilateral lung bases.  Of note, within the last week, patient's wife was diagnosed with breast cancer and his brother was diagnosed with pancreatic cancer.  6/21/24: Patient presents for routine follow-up. MRI abdomen/MRCP reviewed.  Tortuous PD without dilation or concerning features.  CBD measured 9 mm in the setting of CCY.  No choledocholithiasis or masses noted.  He discontinued Levsin and just started WelChol 5 days ago with some improvement in diarrhea.   9/20/24: Patient pressent for routine follow up. Pt takes Welchol intermittently and helps when he remebers to take it. He still has regular abdominal pain that migrates. Minimal relief with antispasmodic. He still eats one large meal a day with two sodas daily. He does report intermittent black stool w/o Pepto or iron use, as well as occasional dysphagia.

## 2024-12-17 ENCOUNTER — OFFICE VISIT (OUTPATIENT)
Dept: URBAN - METROPOLITAN AREA SURGERY CENTER 14 | Facility: SURGERY CENTER | Age: 64
End: 2024-12-17

## 2024-12-17 ENCOUNTER — CLAIMS CREATED FROM THE CLAIM WINDOW (OUTPATIENT)
Dept: URBAN - METROPOLITAN AREA SURGERY CENTER 14 | Facility: SURGERY CENTER | Age: 64
End: 2024-12-17
Payer: COMMERCIAL

## 2024-12-17 DIAGNOSIS — K92.1 MELENA: ICD-10-CM

## 2024-12-17 DIAGNOSIS — Z53.8 PROCEDURE AND TREATMENT NOT CARRIED OUT FOR OTHER REASONS: ICD-10-CM

## 2024-12-17 DIAGNOSIS — R13.19 DYSPHAGIA: ICD-10-CM

## 2024-12-17 PROCEDURE — 00731 ANES UPR GI NDSC PX NOS: CPT | Performed by: NURSE ANESTHETIST, CERTIFIED REGISTERED

## 2024-12-17 RX ORDER — ONDANSETRON 4 MG/1
1 TABLET ON THE TONGUE AND ALLOW TO DISSOLVE TABLET, ORALLY DISINTEGRATING ORAL ONCE A DAY
COMMUNITY

## 2024-12-17 RX ORDER — COLESEVELAM HYDROCHLORIDE 625 MG/1
3 TABLETS WITH MEALS TABLET, COATED ORAL TWICE A DAY
Qty: 180 | Refills: 3 | COMMUNITY
Start: 2024-05-10

## 2024-12-17 RX ORDER — ALBUTEROL SULFATE 108 UG/1
1 PUFF AS NEEDED AEROSOL, METERED RESPIRATORY (INHALATION)
COMMUNITY

## 2024-12-17 RX ORDER — MELOXICAM 15 MG/1
1 TABLET TABLET ORAL ONCE A DAY
COMMUNITY

## 2024-12-17 RX ORDER — HYDROCODONE BITARTRATE AND ACETAMINOPHEN 5; 325 MG/1; MG/1
1 TABLET AS NEEDED TABLET ORAL
COMMUNITY

## 2024-12-17 RX ORDER — METOPROLOL SUCCINATE 50 MG/1
1 TABLET TABLET, FILM COATED, EXTENDED RELEASE ORAL ONCE A DAY
COMMUNITY

## 2024-12-17 RX ORDER — RIZATRIPTAN BENZOATE 10 MG/1
1 TABLET TABLET ORAL ONCE A DAY
COMMUNITY

## 2024-12-17 RX ORDER — LEVOTHYROXINE SODIUM 137 UG/1
1 TABLET IN THE MORNING ON AN EMPTY STOMACH TABLET ORAL ONCE A DAY
COMMUNITY

## 2024-12-17 RX ORDER — PREGABALIN 150 MG/1
1 CAPSULE CAPSULE ORAL ONCE A DAY
COMMUNITY

## 2024-12-17 RX ORDER — BACLOFEN 10 MG/1
1 TABLET AS NEEDED TABLET ORAL TWICE A DAY
COMMUNITY

## 2024-12-17 RX ORDER — NITROGLYCERIN 0.4 MG/1
AS DIRECTED TABLET SUBLINGUAL
COMMUNITY

## 2024-12-17 RX ORDER — ALPRAZOLAM 0.5 MG/1
1 TABLET TABLET ORAL TWICE A DAY
COMMUNITY

## 2024-12-18 ENCOUNTER — TELEPHONE ENCOUNTER (OUTPATIENT)
Dept: URBAN - METROPOLITAN AREA CLINIC 54 | Facility: CLINIC | Age: 64
End: 2024-12-18

## 2025-01-06 ENCOUNTER — LAB OUTSIDE AN ENCOUNTER (OUTPATIENT)
Dept: URBAN - METROPOLITAN AREA CLINIC 54 | Facility: CLINIC | Age: 65
End: 2025-01-06

## 2025-01-06 ENCOUNTER — OFFICE VISIT (OUTPATIENT)
Dept: URBAN - METROPOLITAN AREA CLINIC 54 | Facility: CLINIC | Age: 65
End: 2025-01-06
Payer: COMMERCIAL

## 2025-01-06 VITALS
DIASTOLIC BLOOD PRESSURE: 86 MMHG | HEIGHT: 72 IN | HEART RATE: 63 BPM | TEMPERATURE: 98.1 F | SYSTOLIC BLOOD PRESSURE: 128 MMHG | WEIGHT: 153.8 LBS | BODY MASS INDEX: 20.83 KG/M2

## 2025-01-06 DIAGNOSIS — R19.7 DIARRHEA, UNSPECIFIED: ICD-10-CM

## 2025-01-06 DIAGNOSIS — R16.0 HEPATOMEGALY: ICD-10-CM

## 2025-01-06 DIAGNOSIS — R10.9 ABDOMINAL PAIN: ICD-10-CM

## 2025-01-06 DIAGNOSIS — K92.1 MELENA: ICD-10-CM

## 2025-01-06 DIAGNOSIS — K83.8 COMMON BILE DUCT DILATION: ICD-10-CM

## 2025-01-06 DIAGNOSIS — R13.10 DYSPHAGIA, UNSPECIFIED TYPE: ICD-10-CM

## 2025-01-06 DIAGNOSIS — L98.9 SKIN LESIONS: ICD-10-CM

## 2025-01-06 PROCEDURE — 99214 OFFICE O/P EST MOD 30 MIN: CPT | Performed by: PHYSICIAN ASSISTANT

## 2025-01-06 RX ORDER — NITROGLYCERIN 0.4 MG/1
AS DIRECTED TABLET SUBLINGUAL
Status: ACTIVE | COMMUNITY

## 2025-01-06 RX ORDER — RIZATRIPTAN BENZOATE 10 MG/1
1 TABLET TABLET ORAL ONCE A DAY
Status: ACTIVE | COMMUNITY

## 2025-01-06 RX ORDER — BACLOFEN 10 MG/1
1 TABLET AS NEEDED TABLET ORAL TWICE A DAY
Status: ACTIVE | COMMUNITY

## 2025-01-06 RX ORDER — HYDROCODONE BITARTRATE AND ACETAMINOPHEN 5; 325 MG/1; MG/1
1 TABLET AS NEEDED TABLET ORAL
Status: ACTIVE | COMMUNITY

## 2025-01-06 RX ORDER — METOCLOPRAMIDE 10 MG/1
1 TABLET TABLET ORAL ONCE A DAY
Qty: 1 TABLET | Refills: 0 | OUTPATIENT
Start: 2025-01-06

## 2025-01-06 RX ORDER — PANTOPRAZOLE SODIUM 40 MG/1
1 TABLET 30 MINUTES BEFORE MORNING MEAL TABLET, DELAYED RELEASE ORAL ONCE A DAY
Qty: 90 TABLET | Refills: 3

## 2025-01-06 RX ORDER — METOPROLOL SUCCINATE 50 MG/1
1 TABLET TABLET, FILM COATED, EXTENDED RELEASE ORAL ONCE A DAY
Status: ACTIVE | COMMUNITY

## 2025-01-06 RX ORDER — MELOXICAM 15 MG/1
1 TABLET TABLET ORAL ONCE A DAY
Status: ACTIVE | COMMUNITY

## 2025-01-06 RX ORDER — LEVOTHYROXINE SODIUM 137 UG/1
1 TABLET IN THE MORNING ON AN EMPTY STOMACH TABLET ORAL ONCE A DAY
Status: ACTIVE | COMMUNITY

## 2025-01-06 RX ORDER — PREGABALIN 150 MG/1
1 CAPSULE CAPSULE ORAL ONCE A DAY
Status: ACTIVE | COMMUNITY

## 2025-01-06 RX ORDER — ALBUTEROL SULFATE 108 UG/1
1 PUFF AS NEEDED AEROSOL, METERED RESPIRATORY (INHALATION)
Status: ACTIVE | COMMUNITY

## 2025-01-06 RX ORDER — COLESEVELAM HYDROCHLORIDE 625 MG/1
3 TABLETS WITH MEALS TABLET, COATED ORAL TWICE A DAY
Qty: 180 | Refills: 3 | Status: ACTIVE | COMMUNITY
Start: 2024-05-10

## 2025-01-06 RX ORDER — ONDANSETRON 4 MG/1
1 TABLET ON THE TONGUE AND ALLOW TO DISSOLVE TABLET, ORALLY DISINTEGRATING ORAL ONCE A DAY
Status: ACTIVE | COMMUNITY

## 2025-01-06 RX ORDER — ALPRAZOLAM 0.5 MG/1
1 TABLET TABLET ORAL TWICE A DAY
Status: ACTIVE | COMMUNITY

## 2025-01-06 NOTE — HPI-OTHER HISTORIES
The patient is a 59 year old /White male, who presents on referral from Maegan Crockett MD, for a gastroenterology evaluation for liver concerns. A copy of this document will be sent to the referring provider. The patient reports no significant symptoms related to the elevated LFT's.  The patient relates no significant family or personal history of liver disease. He states a history of mild alcohol use. The patient reports a personal history of no other habits that could cause liver damage.  Interval testing has not been done.     01/08/2020 Patient states he went to his dermatologist to check blisters on his hands, and suspects it has to do with liver. Patient reports donating blood in the past in the 80's and was suspended from blood donation because he had antibodies. Denies any lesions on the legs. Denies any jaundice. Denies any history of hepatitis in the past.  01/27/2020 Labs showed anti-smooth muscle antibodies positive, negative Hepatitis panel. RUQ US done on 01/08/2020 showed hepatomegaly.  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 Denies any jaundice, ascites or pedal edema. Denies any abdominal pain. Still having lesion in left dorsal area of the hand.  02/20/2020 Liver biopsy done on 02/04/2020 showed minimal portal inflammation, no fatty liver, no fibrosis.  Patient denies any consumption of natural or herbal supplements. He has not seen the dermatologist. He states the lesions won't return until the summer time. He states he rarely drinks alcohol. He did have some pain after the liver biopsy at liver bx site. He reports taking Vitamin B12 shots. He has not has B12 level checked. Denies any melena or hematochezia.  03/05/2021 Patient presents on referral from Dr. Maegan Crockett for colon cancer screening. Colonoscopy in 2017 showed polyps in the ascending colon, transverse colon, and sigmoid colon. Advised him to have repeat colonoscopy in 3 years. Biopsy showed tubular adenomas. Patient c/o of abdominal pain for severl months, black stool. He currently takes Narco. No family history of colitis. Patient lost about 30lbs over past 6 months, but appetite is normal. His bowel movement occur 3-4 times a day, intermittent bloody stool. His mother passed away from stomach cancer which metastisized. Some tenderness in LUQ. His cardiologist doubled his Porotonix dosage.  12/8/23: Patient presents for routine follow up. Patient underwent EGD and colonoscopy in April 2021 with Dr. Garcia. EGD was performed for epigastric pain and weight loss that showed LA grade A reflux esophagitis with widely patent Schatzki's ring. Acute gastritis and duodenitis. Colonoscopy showed hemorrhoids on perianal exam and inflamed mucosa in the sigmoid colon, descending colon, and at the hepatic flexure and biopsied. EGD and colon biopsies were benign. Repeat colonoscopy in 5 years for surveillance.   Today, patient still complains of generalized abdominal pain and cramping daily. Reports 4-5 BMs daily with varying consistencies and mucus. Patient is concerned about parasites. Does not take anything for abdominal pain. No NSAIDs or EtOH. Daily tobacco and marijuana use.  Recent outside labs were unremarkable. CT A/P with contrast from 7/2023 for LLQ and lower back pain showed no acute abdominal or pelvic abnormalities. He is s/p back surgery in April with Dr Alexander and most recently in Nov with Dr Cohen.  2/9/24: Patient presents for routine follow up. Patient he trialed dicyclomine three times daily x 1 month without improvement. Still with daily diffuse abdominal cramping. Stool studies for diarrhea were negative. Patient with 3-4 BMs daily. Still with daily MJ use.  5/10/24: Patient present for routine follow-up.  He reports mild improvement with Levsin but only takes this once daily prior to his only meal.  Abdominal cramping still occurs daily.  He still has 3-5 loose to watery BMs daily.  He was recently at [facility] on 3/28/2024 for new onset RUQ pain that radiated through to his back.  Normal LFTs and lipase.  CT A/P showed CBD dilation at 16 mm s/p CCY and moderate patchy atelectasis vs consolidation at the bilateral lung bases.  Of note, within the last week, patient's wife was diagnosed with breast cancer and his brother was diagnosed with pancreatic cancer.  6/21/24: Patient presents for routine follow-up. MRI abdomen/MRCP reviewed.  Tortuous PD without dilation or concerning features.  CBD measured 9 mm in the setting of CCY.  No choledocholithiasis or masses noted.  He discontinued Levsin and just started WelChol 5 days ago with some improvement in diarrhea.   9/20/24: Patient pressent for routine follow up. Pt takes Welchol intermittently and helps when he remebers to take it. He still has regular abdominal pain that migrates. Minimal relief with antispasmodic. He still eats one large meal a day with two sodas daily. He does report intermittent black stool w/o Pepto or iron use, as well as occasional dysphagia.  1/6/2025: Patient presents for routine follow-up for dysphagia.  EGD with Dr. Desai on 12/17 showed normal esophagus, but large amount of liquid food residue in the stomach.  Patient was recommended to repeat EGD further evaluation. Dysphagia is intermittent, but significantly alleviated with eating smaller bites and chewing thoroughly. Intermittnet black stool. Still intermittently taking WelChol. Requesting pantoprazole refill.

## 2025-01-07 ENCOUNTER — ERX REFILL RESPONSE (OUTPATIENT)
Dept: URBAN - NONMETROPOLITAN AREA CLINIC 4 | Facility: CLINIC | Age: 65
End: 2025-01-07

## 2025-01-07 RX ORDER — COLESEVELAM HYDROCHLORIDE 625 MG/1
TAKE 3 TABLETS BY MOUTH TWICE A DAY WITH MEALS TABLET, FILM COATED ORAL
Qty: 180 TABLET | Refills: 3 | OUTPATIENT

## 2025-01-07 RX ORDER — HYOSCYAMINE SULFATE 0.12 MG/1
TAKE ONE TABLET BY MOUTH THREE TIMES A DAY TAKE 30 MINUTES BEFORE MEALS FOR ABDOMINAL PAIN TABLET ORAL
Qty: 90 TABLET | Refills: 0 | OUTPATIENT

## 2025-01-07 RX ORDER — HYOSCYAMINE SULFATE 0.12 MG/1
TAKE ONE TABLET BY MOUTH THREE TIMES A DAY TAKE 30 MINUTES BEFORE MEALS FOR ABDOMINAL PAIN TABLET ORAL
Qty: 90 TABLET | Refills: 5 | OUTPATIENT

## 2025-01-07 RX ORDER — COLESEVELAM HYDROCHLORIDE 625 MG/1
3 TABLETS WITH MEALS TABLET, COATED ORAL TWICE A DAY
Qty: 180 | Refills: 3 | OUTPATIENT

## 2025-02-27 ENCOUNTER — OFFICE VISIT (OUTPATIENT)
Dept: URBAN - METROPOLITAN AREA SURGERY CENTER 14 | Facility: SURGERY CENTER | Age: 65
End: 2025-02-27

## 2025-02-27 RX ORDER — LEVOTHYROXINE SODIUM 137 UG/1
1 TABLET IN THE MORNING ON AN EMPTY STOMACH TABLET ORAL ONCE A DAY
Status: ACTIVE | COMMUNITY

## 2025-02-27 RX ORDER — METOCLOPRAMIDE 10 MG/1
1 TABLET TABLET ORAL ONCE A DAY
Qty: 1 TABLET | Refills: 0 | Status: ACTIVE | COMMUNITY
Start: 2025-01-06

## 2025-02-27 RX ORDER — RIZATRIPTAN BENZOATE 10 MG/1
1 TABLET TABLET ORAL ONCE A DAY
Status: ACTIVE | COMMUNITY

## 2025-02-27 RX ORDER — NITROGLYCERIN 0.4 MG/1
AS DIRECTED TABLET SUBLINGUAL
Status: ACTIVE | COMMUNITY

## 2025-02-27 RX ORDER — BACLOFEN 10 MG/1
1 TABLET AS NEEDED TABLET ORAL TWICE A DAY
Status: ACTIVE | COMMUNITY

## 2025-02-27 RX ORDER — ALBUTEROL SULFATE 108 UG/1
1 PUFF AS NEEDED AEROSOL, METERED RESPIRATORY (INHALATION)
Status: ACTIVE | COMMUNITY

## 2025-02-27 RX ORDER — MELOXICAM 15 MG/1
1 TABLET TABLET ORAL ONCE A DAY
Status: ACTIVE | COMMUNITY

## 2025-02-27 RX ORDER — PANTOPRAZOLE SODIUM 40 MG/1
1 TABLET 30 MINUTES BEFORE MORNING MEAL TABLET, DELAYED RELEASE ORAL ONCE A DAY
Qty: 90 TABLET | Refills: 3 | Status: ACTIVE | COMMUNITY

## 2025-02-27 RX ORDER — HYOSCYAMINE SULFATE 0.12 MG/1
TAKE ONE TABLET BY MOUTH THREE TIMES A DAY TAKE 30 MINUTES BEFORE MEALS FOR ABDOMINAL PAIN TABLET ORAL
Qty: 90 TABLET | Refills: 5 | Status: ACTIVE | COMMUNITY

## 2025-02-27 RX ORDER — COLESEVELAM HYDROCHLORIDE 625 MG/1
TAKE 3 TABLETS BY MOUTH TWICE A DAY WITH MEALS TABLET, FILM COATED ORAL
Qty: 180 TABLET | Refills: 3 | Status: ACTIVE | COMMUNITY

## 2025-02-27 RX ORDER — HYDROCODONE BITARTRATE AND ACETAMINOPHEN 5; 325 MG/1; MG/1
1 TABLET AS NEEDED TABLET ORAL
Status: ACTIVE | COMMUNITY

## 2025-02-27 RX ORDER — PREGABALIN 150 MG/1
1 CAPSULE CAPSULE ORAL ONCE A DAY
Status: ACTIVE | COMMUNITY

## 2025-02-27 RX ORDER — METOPROLOL SUCCINATE 50 MG/1
1 TABLET TABLET, FILM COATED, EXTENDED RELEASE ORAL ONCE A DAY
Status: ACTIVE | COMMUNITY

## 2025-02-27 RX ORDER — ALPRAZOLAM 0.5 MG/1
1 TABLET TABLET ORAL TWICE A DAY
Status: ACTIVE | COMMUNITY

## 2025-02-27 RX ORDER — ONDANSETRON 4 MG/1
1 TABLET ON THE TONGUE AND ALLOW TO DISSOLVE TABLET, ORALLY DISINTEGRATING ORAL ONCE A DAY
Status: ACTIVE | COMMUNITY

## 2025-03-14 ENCOUNTER — OFFICE VISIT (OUTPATIENT)
Dept: URBAN - METROPOLITAN AREA CLINIC 54 | Facility: CLINIC | Age: 65
End: 2025-03-14
Payer: COMMERCIAL

## 2025-03-14 VITALS
HEART RATE: 80 BPM | TEMPERATURE: 97.2 F | DIASTOLIC BLOOD PRESSURE: 90 MMHG | WEIGHT: 158 LBS | HEIGHT: 72 IN | SYSTOLIC BLOOD PRESSURE: 149 MMHG | BODY MASS INDEX: 21.4 KG/M2

## 2025-03-14 DIAGNOSIS — R13.10 DYSPHAGIA, UNSPECIFIED TYPE: ICD-10-CM

## 2025-03-14 DIAGNOSIS — R19.7 DIARRHEA, UNSPECIFIED: ICD-10-CM

## 2025-03-14 DIAGNOSIS — L98.9 SKIN LESIONS: ICD-10-CM

## 2025-03-14 DIAGNOSIS — K92.1 MELENA: ICD-10-CM

## 2025-03-14 DIAGNOSIS — R16.0 HEPATOMEGALY: ICD-10-CM

## 2025-03-14 DIAGNOSIS — K83.8 COMMON BILE DUCT DILATION: ICD-10-CM

## 2025-03-14 DIAGNOSIS — R10.84 ABDOMINAL CRAMPING, GENERALIZED: ICD-10-CM

## 2025-03-14 PROCEDURE — 99214 OFFICE O/P EST MOD 30 MIN: CPT | Performed by: PHYSICIAN ASSISTANT

## 2025-03-14 RX ORDER — PANTOPRAZOLE SODIUM 40 MG/1
1 TABLET 30 MINUTES BEFORE MORNING MEAL TABLET, DELAYED RELEASE ORAL ONCE A DAY
Qty: 90 TABLET | Refills: 3 | Status: ACTIVE | COMMUNITY

## 2025-03-14 RX ORDER — BACLOFEN 10 MG/1
1 TABLET AS NEEDED TABLET ORAL TWICE A DAY
Status: ACTIVE | COMMUNITY

## 2025-03-14 RX ORDER — ONDANSETRON 4 MG/1
1 TABLET ON THE TONGUE AND ALLOW TO DISSOLVE TABLET, ORALLY DISINTEGRATING ORAL ONCE A DAY
Status: ACTIVE | COMMUNITY

## 2025-03-14 RX ORDER — HYDROCODONE BITARTRATE AND ACETAMINOPHEN 5; 325 MG/1; MG/1
1 TABLET AS NEEDED TABLET ORAL
Status: ACTIVE | COMMUNITY

## 2025-03-14 RX ORDER — COLESEVELAM HYDROCHLORIDE 625 MG/1
TAKE 3 TABLETS BY MOUTH TWICE A DAY WITH MEALS TABLET, FILM COATED ORAL
Qty: 180 TABLET | Refills: 3 | Status: ACTIVE | COMMUNITY

## 2025-03-14 RX ORDER — HYOSCYAMINE SULFATE 0.12 MG/1
TAKE ONE TABLET BY MOUTH THREE TIMES A DAY TAKE 30 MINUTES BEFORE MEALS FOR ABDOMINAL PAIN TABLET ORAL
Qty: 90 TABLET | Refills: 5 | Status: ACTIVE | COMMUNITY

## 2025-03-14 RX ORDER — METOCLOPRAMIDE 10 MG/1
1 TABLET TABLET ORAL ONCE A DAY
Qty: 1 TABLET | Refills: 0 | Status: ACTIVE | COMMUNITY
Start: 2025-01-06

## 2025-03-14 RX ORDER — MELOXICAM 15 MG/1
1 TABLET TABLET ORAL ONCE A DAY
Status: ACTIVE | COMMUNITY

## 2025-03-14 RX ORDER — RIZATRIPTAN BENZOATE 10 MG/1
1 TABLET TABLET ORAL ONCE A DAY
Status: ACTIVE | COMMUNITY

## 2025-03-14 RX ORDER — ALPRAZOLAM 0.5 MG/1
1 TABLET TABLET ORAL TWICE A DAY
Status: ACTIVE | COMMUNITY

## 2025-03-14 RX ORDER — PREGABALIN 150 MG/1
1 CAPSULE CAPSULE ORAL ONCE A DAY
Status: ACTIVE | COMMUNITY

## 2025-03-14 RX ORDER — ALBUTEROL SULFATE 108 UG/1
1 PUFF AS NEEDED AEROSOL, METERED RESPIRATORY (INHALATION)
Status: ACTIVE | COMMUNITY

## 2025-03-14 RX ORDER — METOPROLOL SUCCINATE 50 MG/1
1 TABLET TABLET, FILM COATED, EXTENDED RELEASE ORAL ONCE A DAY
Status: ACTIVE | COMMUNITY

## 2025-03-14 RX ORDER — NITROGLYCERIN 0.4 MG/1
AS DIRECTED TABLET SUBLINGUAL
Status: ACTIVE | COMMUNITY

## 2025-03-14 RX ORDER — LEVOTHYROXINE SODIUM 137 UG/1
1 TABLET IN THE MORNING ON AN EMPTY STOMACH TABLET ORAL ONCE A DAY
Status: ACTIVE | COMMUNITY

## 2025-03-14 NOTE — HPI-OTHER HISTORIES
The patient is a 59 year old /White male, who presents on referral from Maegan Crockett MD, for a gastroenterology evaluation for liver concerns. A copy of this document will be sent to the referring provider. The patient reports no significant symptoms related to the elevated LFT's.  The patient relates no significant family or personal history of liver disease. He states a history of mild alcohol use. The patient reports a personal history of no other habits that could cause liver damage.  Interval testing has not been done.     01/08/2020 Patient states he went to his dermatologist to check blisters on his hands, and suspects it has to do with liver. Patient reports donating blood in the past in the 80's and was suspended from blood donation because he had antibodies. Denies any lesions on the legs. Denies any jaundice. Denies any history of hepatitis in the past.  01/27/2020 Labs showed anti-smooth muscle antibodies positive, negative Hepatitis panel. RUQ US done on 01/08/2020 showed hepatomegaly.  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 Denies any jaundice, ascites or pedal edema. Denies any abdominal pain. Still having lesion in left dorsal area of the hand.  02/20/2020 Liver biopsy done on 02/04/2020 showed minimal portal inflammation, no fatty liver, no fibrosis.  Patient denies any consumption of natural or herbal supplements. He has not seen the dermatologist. He states the lesions won't return until the summer time. He states he rarely drinks alcohol. He did have some pain after the liver biopsy at liver bx site. He reports taking Vitamin B12 shots. He has not has B12 level checked. Denies any melena or hematochezia.  03/05/2021 Patient presents on referral from Dr. Maegan Crockett for colon cancer screening. Colonoscopy in 2017 showed polyps in the ascending colon, transverse colon, and sigmoid colon. Advised him to have repeat colonoscopy in 3 years. Biopsy showed tubular adenomas. Patient c/o of abdominal pain for severl months, black stool. He currently takes Narco. No family history of colitis. Patient lost about 30lbs over past 6 months, but appetite is normal. His bowel movement occur 3-4 times a day, intermittent bloody stool. His mother passed away from stomach cancer which metastisized. Some tenderness in LUQ. His cardiologist doubled his Porotonix dosage.  12/8/23: Patient presents for routine follow up. Patient underwent EGD and colonoscopy in April 2021 with Dr. Garcia. EGD was performed for epigastric pain and weight loss that showed LA grade A reflux esophagitis with widely patent Schatzki's ring. Acute gastritis and duodenitis. Colonoscopy showed hemorrhoids on perianal exam and inflamed mucosa in the sigmoid colon, descending colon, and at the hepatic flexure and biopsied. EGD and colon biopsies were benign. Repeat colonoscopy in 5 years for surveillance.   Today, patient still complains of generalized abdominal pain and cramping daily. Reports 4-5 BMs daily with varying consistencies and mucus. Patient is concerned about parasites. Does not take anything for abdominal pain. No NSAIDs or EtOH. Daily tobacco and marijuana use.  Recent outside labs were unremarkable. CT A/P with contrast from 7/2023 for LLQ and lower back pain showed no acute abdominal or pelvic abnormalities. He is s/p back surgery in April with Dr Alexander and most recently in Nov with Dr Cohen.  2/9/24: Patient presents for routine follow up. Patient he trialed dicyclomine three times daily x 1 month without improvement. Still with daily diffuse abdominal cramping. Stool studies for diarrhea were negative. Patient with 3-4 BMs daily. Still with daily MJ use.  5/10/24: Patient present for routine follow-up.  He reports mild improvement with Levsin but only takes this once daily prior to his only meal.  Abdominal cramping still occurs daily.  He still has 3-5 loose to watery BMs daily.  He was recently at [facility] on 3/28/2024 for new onset RUQ pain that radiated through to his back.  Normal LFTs and lipase.  CT A/P showed CBD dilation at 16 mm s/p CCY and moderate patchy atelectasis vs consolidation at the bilateral lung bases.  Of note, within the last week, patient's wife was diagnosed with breast cancer and his brother was diagnosed with pancreatic cancer.  6/21/24: Patient presents for routine follow-up. MRI abdomen/MRCP reviewed.  Tortuous PD without dilation or concerning features.  CBD measured 9 mm in the setting of CCY.  No choledocholithiasis or masses noted.  He discontinued Levsin and just started WelChol 5 days ago with some improvement in diarrhea.   9/20/24: Patient pressent for routine follow up. Pt takes Welchol intermittently and helps when he remebers to take it. He still has regular abdominal pain that migrates. Minimal relief with antispasmodic. He still eats one large meal a day with two sodas daily. He does report intermittent black stool w/o Pepto or iron use, as well as occasional dysphagia.  1/6/2025: Patient presents for routine follow-up for dysphagia.  EGD with Dr. Desai on 12/17 showed normal esophagus, but large amount of liquid food residue in the stomach.  Patient was recommended to repeat EGD further evaluation. Dysphagia is intermittent, but significantly alleviated with eating smaller bites and chewing thoroughly. Intermittnet black stool. Still intermittently taking WelChol. Requesting pantoprazole refill.  3/14/2025: Patient presents for routine follow-up of dysphagia and melena.  EGD with Dr. Desai on 12/127 with large about of liquid food reside in the stomach. Repeat EGD on 2/27/2025 shows no esophageal abnormality to explain dysphagia s/p dilation, gastric erythema, otherwise unremarkable.  Benign gastric biopsies. Patient reports improvement in dysphagia with some throat soreness. He does still note intermittent black stool. Last episode was a couple weeks ago. Still no Pepto or iron. Colonoscopy in 2021 with left sided inflammation. ?No bx on file. Still with diarrhea and states he is now compliant with WelChol. Of note, he switched PCPs - Dr Barbosa with [facility]. He has reduced tobacco from 2 packs daily to 1/2 pack daily. He also has genetic testing pending.   EGD on 2/27/25:  -No endoscopic esophageal abnormality to explain patient's dysphagia.  Esophagus dilated with 51F savory.  No resistance or heme noted. -Z-line irregular, 40 cm from the incisors. -Erythematous mucosa in the stomach.  Biopsied. -Normal examined duodenum. -Mild chemical/reactive gastropathy.  No H. pylori or intestinal metaplasia.

## 2025-03-15 LAB
ABSOLUTE BASOPHILS: 48
ABSOLUTE EOSINOPHILS: 132
ABSOLUTE LYMPHOCYTES: 2466
ABSOLUTE MONOCYTES: 504
ABSOLUTE NEUTROPHILS: 2850
BASOPHILS: 0.8
EOSINOPHILS: 2.2
FERRITIN, SERUM: 87
HEMATOCRIT: 47.9
HEMOGLOBIN: 15.6
IRON BIND.CAP.(TIBC): 293
IRON SATURATION: 18
IRON: 52
LYMPHOCYTES: 41.1
MCH: 31.4
MCHC: 32.6
MCV: 96.4
MONOCYTES: 8.4
MPV: 9.2
NEUTROPHILS: 47.5
PLATELET COUNT: 235
RDW: 14.2
RED BLOOD CELL COUNT: 4.97
WHITE BLOOD CELL COUNT: 6

## 2025-05-13 ENCOUNTER — TELEPHONE ENCOUNTER (OUTPATIENT)
Dept: URBAN - METROPOLITAN AREA CLINIC 82 | Facility: CLINIC | Age: 65
End: 2025-05-13

## 2025-05-15 ENCOUNTER — CLAIMS CREATED FROM THE CLAIM WINDOW (OUTPATIENT)
Dept: URBAN - METROPOLITAN AREA SURGERY CENTER 14 | Facility: SURGERY CENTER | Age: 65
End: 2025-05-15
Payer: COMMERCIAL

## 2025-05-15 ENCOUNTER — CLAIMS CREATED FROM THE CLAIM WINDOW (OUTPATIENT)
Dept: URBAN - METROPOLITAN AREA CLINIC 4 | Facility: CLINIC | Age: 65
End: 2025-05-15
Payer: COMMERCIAL

## 2025-05-15 DIAGNOSIS — D12.5 BENIGN NEOPLASM OF SIGMOID COLON: ICD-10-CM

## 2025-05-15 DIAGNOSIS — K63.89 OTHER SPECIFIED DISEASES OF INTESTINE: ICD-10-CM

## 2025-05-15 DIAGNOSIS — D12.3 BENIGN NEOPLASM OF TRANSVERSE COLON: ICD-10-CM

## 2025-05-15 DIAGNOSIS — E03.9 HYPOTHYROIDISM, UNSPECIFIED TYPE: ICD-10-CM

## 2025-05-15 DIAGNOSIS — R19.7 ACUTE DIARRHEA: ICD-10-CM

## 2025-05-15 DIAGNOSIS — F41.9 ANXIETY: ICD-10-CM

## 2025-05-15 DIAGNOSIS — D12.3 ADENOMA OF TRANSVERSE COLON: ICD-10-CM

## 2025-05-15 DIAGNOSIS — D12.5 ADENOMA OF SIGMOID COLON: ICD-10-CM

## 2025-05-15 PROCEDURE — 45380 COLONOSCOPY AND BIOPSY: CPT | Performed by: INTERNAL MEDICINE

## 2025-05-15 PROCEDURE — 45385 COLONOSCOPY W/LESION REMOVAL: CPT | Performed by: INTERNAL MEDICINE

## 2025-05-15 PROCEDURE — 88305 TISSUE EXAM BY PATHOLOGIST: CPT | Performed by: PATHOLOGY

## 2025-05-15 PROCEDURE — 00811 ANES LWR INTST NDSC NOS: CPT | Performed by: NURSE ANESTHETIST, CERTIFIED REGISTERED

## 2025-05-15 RX ORDER — ALPRAZOLAM 0.5 MG/1
1 TABLET TABLET ORAL TWICE A DAY
Status: ACTIVE | COMMUNITY

## 2025-05-15 RX ORDER — METOCLOPRAMIDE 10 MG/1
1 TABLET TABLET ORAL ONCE A DAY
Qty: 1 TABLET | Refills: 0 | Status: ACTIVE | COMMUNITY
Start: 2025-01-06

## 2025-05-15 RX ORDER — PANTOPRAZOLE SODIUM 40 MG/1
1 TABLET 30 MINUTES BEFORE MORNING MEAL TABLET, DELAYED RELEASE ORAL ONCE A DAY
Qty: 90 TABLET | Refills: 3 | Status: ACTIVE | COMMUNITY

## 2025-05-15 RX ORDER — RIZATRIPTAN BENZOATE 10 MG/1
1 TABLET TABLET ORAL ONCE A DAY
Status: ACTIVE | COMMUNITY

## 2025-05-15 RX ORDER — COLESEVELAM HYDROCHLORIDE 625 MG/1
TAKE 3 TABLETS BY MOUTH TWICE A DAY WITH MEALS TABLET, FILM COATED ORAL
Qty: 180 TABLET | Refills: 3 | Status: ACTIVE | COMMUNITY

## 2025-05-15 RX ORDER — NITROGLYCERIN 0.4 MG/1
AS DIRECTED TABLET SUBLINGUAL
Status: ACTIVE | COMMUNITY

## 2025-05-15 RX ORDER — METOPROLOL SUCCINATE 50 MG/1
1 TABLET TABLET, FILM COATED, EXTENDED RELEASE ORAL ONCE A DAY
Status: ACTIVE | COMMUNITY

## 2025-05-15 RX ORDER — PREGABALIN 150 MG/1
1 CAPSULE CAPSULE ORAL ONCE A DAY
Status: ACTIVE | COMMUNITY

## 2025-05-15 RX ORDER — HYOSCYAMINE SULFATE 0.12 MG/1
TAKE ONE TABLET BY MOUTH THREE TIMES A DAY TAKE 30 MINUTES BEFORE MEALS FOR ABDOMINAL PAIN TABLET ORAL
Qty: 90 TABLET | Refills: 5 | Status: ACTIVE | COMMUNITY

## 2025-05-15 RX ORDER — ALBUTEROL SULFATE 108 UG/1
1 PUFF AS NEEDED AEROSOL, METERED RESPIRATORY (INHALATION)
Status: ACTIVE | COMMUNITY

## 2025-05-15 RX ORDER — ONDANSETRON 4 MG/1
1 TABLET ON THE TONGUE AND ALLOW TO DISSOLVE TABLET, ORALLY DISINTEGRATING ORAL ONCE A DAY
Status: ACTIVE | COMMUNITY

## 2025-05-15 RX ORDER — HYDROCODONE BITARTRATE AND ACETAMINOPHEN 5; 325 MG/1; MG/1
1 TABLET AS NEEDED TABLET ORAL
Status: ACTIVE | COMMUNITY

## 2025-05-15 RX ORDER — MELOXICAM 15 MG/1
1 TABLET TABLET ORAL ONCE A DAY
Status: ACTIVE | COMMUNITY

## 2025-05-15 RX ORDER — LEVOTHYROXINE SODIUM 137 UG/1
1 TABLET IN THE MORNING ON AN EMPTY STOMACH TABLET ORAL ONCE A DAY
Status: ACTIVE | COMMUNITY

## 2025-05-15 RX ORDER — BACLOFEN 10 MG/1
1 TABLET AS NEEDED TABLET ORAL TWICE A DAY
Status: ACTIVE | COMMUNITY

## 2025-05-23 ENCOUNTER — OFFICE VISIT (OUTPATIENT)
Dept: URBAN - METROPOLITAN AREA CLINIC 54 | Facility: CLINIC | Age: 65
End: 2025-05-23
Payer: COMMERCIAL

## 2025-05-23 DIAGNOSIS — K58.0 IRRITABLE BOWEL SYNDROME WITH DIARRHEA: ICD-10-CM

## 2025-05-23 DIAGNOSIS — K83.8 COMMON BILE DUCT DILATION: ICD-10-CM

## 2025-05-23 DIAGNOSIS — R13.10 DYSPHAGIA, UNSPECIFIED TYPE: ICD-10-CM

## 2025-05-23 DIAGNOSIS — K92.1 MELENA: ICD-10-CM

## 2025-05-23 DIAGNOSIS — L98.9 SKIN LESIONS: ICD-10-CM

## 2025-05-23 DIAGNOSIS — R16.0 HEPATOMEGALY: ICD-10-CM

## 2025-05-23 PROBLEM — 197125005: Status: ACTIVE | Noted: 2025-05-23

## 2025-05-23 PROCEDURE — 99214 OFFICE O/P EST MOD 30 MIN: CPT | Performed by: PHYSICIAN ASSISTANT

## 2025-05-23 RX ORDER — METOPROLOL SUCCINATE 50 MG/1
1 TABLET TABLET, FILM COATED, EXTENDED RELEASE ORAL ONCE A DAY
Status: ACTIVE | COMMUNITY

## 2025-05-23 RX ORDER — RIFAXIMIN 550 MG/1
1 TABLET TABLET ORAL THREE TIMES A DAY
Qty: 42 TABLET | Refills: 0 | OUTPATIENT
Start: 2025-05-23 | End: 2025-06-06

## 2025-05-23 RX ORDER — HYOSCYAMINE SULFATE 0.12 MG/1
TAKE ONE TABLET BY MOUTH THREE TIMES A DAY TAKE 30 MINUTES BEFORE MEALS FOR ABDOMINAL PAIN TABLET ORAL
Qty: 90 TABLET | Refills: 5 | Status: ACTIVE | COMMUNITY

## 2025-05-23 RX ORDER — ALPRAZOLAM 0.5 MG/1
1 TABLET TABLET ORAL TWICE A DAY
Status: ACTIVE | COMMUNITY

## 2025-05-23 RX ORDER — PANTOPRAZOLE SODIUM 40 MG/1
1 TABLET 30 MINUTES BEFORE MORNING MEAL TABLET, DELAYED RELEASE ORAL ONCE A DAY
Qty: 90 TABLET | Refills: 3 | Status: ACTIVE | COMMUNITY

## 2025-05-23 RX ORDER — COLESEVELAM HYDROCHLORIDE 625 MG/1
TAKE 3 TABLETS BY MOUTH TWICE A DAY WITH MEALS TABLET, FILM COATED ORAL
Qty: 180 TABLET | Refills: 3 | Status: ACTIVE | COMMUNITY

## 2025-05-23 RX ORDER — AMLODIPINE BESYLATE 2.5 MG/1
1 TABLET TABLET ORAL ONCE A DAY
Status: ACTIVE | COMMUNITY

## 2025-05-23 RX ORDER — MELOXICAM 15 MG/1
1 TABLET TABLET ORAL ONCE A DAY
Status: ACTIVE | COMMUNITY

## 2025-05-23 RX ORDER — ONDANSETRON 4 MG/1
1 TABLET ON THE TONGUE AND ALLOW TO DISSOLVE TABLET, ORALLY DISINTEGRATING ORAL ONCE A DAY
Status: ACTIVE | COMMUNITY

## 2025-05-23 RX ORDER — NITROGLYCERIN 0.4 MG/1
AS DIRECTED TABLET SUBLINGUAL
Status: ACTIVE | COMMUNITY

## 2025-05-23 RX ORDER — RIZATRIPTAN BENZOATE 10 MG/1
1 TABLET TABLET ORAL ONCE A DAY
Status: ACTIVE | COMMUNITY

## 2025-05-23 RX ORDER — PREGABALIN 150 MG/1
1 CAPSULE CAPSULE ORAL ONCE A DAY
Status: ACTIVE | COMMUNITY

## 2025-05-23 RX ORDER — ALBUTEROL SULFATE 108 UG/1
1 PUFF AS NEEDED AEROSOL, METERED RESPIRATORY (INHALATION)
Status: ACTIVE | COMMUNITY

## 2025-05-23 RX ORDER — BACLOFEN 10 MG/1
1 TABLET AS NEEDED TABLET ORAL TWICE A DAY
Status: ACTIVE | COMMUNITY

## 2025-05-23 RX ORDER — LEVOTHYROXINE SODIUM 137 UG/1
1 TABLET IN THE MORNING ON AN EMPTY STOMACH TABLET ORAL ONCE A DAY
Status: ACTIVE | COMMUNITY

## 2025-05-23 RX ORDER — METOCLOPRAMIDE 10 MG/1
1 TABLET TABLET ORAL ONCE A DAY
Qty: 1 TABLET | Refills: 0 | Status: ACTIVE | COMMUNITY
Start: 2025-01-06

## 2025-05-23 RX ORDER — HYDROCODONE BITARTRATE AND ACETAMINOPHEN 5; 325 MG/1; MG/1
1 TABLET AS NEEDED TABLET ORAL
Status: ACTIVE | COMMUNITY

## 2025-05-23 NOTE — PHYSICAL EXAM SKIN:
no rashes , no suspicious lesions , no areas of discoloration , no jaundice present , good turgor , no masses , no tenderness on palpation Home with home care

## 2025-05-23 NOTE — HPI-OTHER HISTORIES
The patient is a 59 year old /White male, who presents on referral from Maegan Crockett MD, for a gastroenterology evaluation for liver concerns. A copy of this document will be sent to the referring provider. The patient reports no significant symptoms related to the elevated LFT's.  The patient relates no significant family or personal history of liver disease. He states a history of mild alcohol use. The patient reports a personal history of no other habits that could cause liver damage.  Interval testing has not been done.     01/08/2020 Patient states he went to his dermatologist to check blisters on his hands, and suspects it has to do with liver. Patient reports donating blood in the past in the 80's and was suspended from blood donation because he had antibodies. Denies any lesions on the legs. Denies any jaundice. Denies any history of hepatitis in the past.  01/27/2020 Labs showed anti-smooth muscle antibodies positive, negative Hepatitis panel. RUQ US done on 01/08/2020 showed hepatomegaly.  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 Denies any jaundice, ascites or pedal edema. Denies any abdominal pain. Still having lesion in left dorsal area of the hand.  02/20/2020 Liver biopsy done on 02/04/2020 showed minimal portal inflammation, no fatty liver, no fibrosis.  Patient denies any consumption of natural or herbal supplements. He has not seen the dermatologist. He states the lesions won't return until the summer time. He states he rarely drinks alcohol. He did have some pain after the liver biopsy at liver bx site. He reports taking Vitamin B12 shots. He has not has B12 level checked. Denies any melena or hematochezia.  03/05/2021 Patient presents on referral from Dr. Maegan Crockett for colon cancer screening. Colonoscopy in 2017 showed polyps in the ascending colon, transverse colon, and sigmoid colon. Advised him to have repeat colonoscopy in 3 years. Biopsy showed tubular adenomas. Patient c/o of abdominal pain for severl months, black stool. He currently takes Narco. No family history of colitis. Patient lost about 30lbs over past 6 months, but appetite is normal. His bowel movement occur 3-4 times a day, intermittent bloody stool. His mother passed away from stomach cancer which metastisized. Some tenderness in LUQ. His cardiologist doubled his Porotonix dosage.  12/8/23: Patient presents for routine follow up. Patient underwent EGD and colonoscopy in April 2021 with Dr. Garcia. EGD was performed for epigastric pain and weight loss that showed LA grade A reflux esophagitis with widely patent Schatzki's ring. Acute gastritis and duodenitis. Colonoscopy showed hemorrhoids on perianal exam and inflamed mucosa in the sigmoid colon, descending colon, and at the hepatic flexure and biopsied. EGD and colon biopsies were benign. Repeat colonoscopy in 5 years for surveillance.   Today, patient still complains of generalized abdominal pain and cramping daily. Reports 4-5 BMs daily with varying consistencies and mucus. Patient is concerned about parasites. Does not take anything for abdominal pain. No NSAIDs or EtOH. Daily tobacco and marijuana use.  Recent outside labs were unremarkable. CT A/P with contrast from 7/2023 for LLQ and lower back pain showed no acute abdominal or pelvic abnormalities. He is s/p back surgery in April with Dr Alexander and most recently in Nov with Dr Cohen.  2/9/24: Patient presents for routine follow up. Patient he trialed dicyclomine three times daily x 1 month without improvement. Still with daily diffuse abdominal cramping. Stool studies for diarrhea were negative. Patient with 3-4 BMs daily. Still with daily MJ use.  5/10/24: Patient present for routine follow-up.  He reports mild improvement with Levsin but only takes this once daily prior to his only meal.  Abdominal cramping still occurs daily.  He still has 3-5 loose to watery BMs daily.  He was recently at [facility] on 3/28/2024 for new onset RUQ pain that radiated through to his back.  Normal LFTs and lipase.  CT A/P showed CBD dilation at 16 mm s/p CCY and moderate patchy atelectasis vs consolidation at the bilateral lung bases.  Of note, within the last week, patient's wife was diagnosed with breast cancer and his brother was diagnosed with pancreatic cancer.  6/21/24: Patient presents for routine follow-up. MRI abdomen/MRCP reviewed.  Tortuous PD without dilation or concerning features.  CBD measured 9 mm in the setting of CCY.  No choledocholithiasis or masses noted.  He discontinued Levsin and just started WelChol 5 days ago with some improvement in diarrhea.   9/20/24: Patient pressent for routine follow up. Pt takes Welchol intermittently and helps when he remebers to take it. He still has regular abdominal pain that migrates. Minimal relief with antispasmodic. He still eats one large meal a day with two sodas daily. He does report intermittent black stool w/o Pepto or iron use, as well as occasional dysphagia.  1/6/2025: Patient presents for routine follow-up for dysphagia.  EGD with Dr. Desai on 12/17 showed normal esophagus, but large amount of liquid food residue in the stomach.  Patient was recommended to repeat EGD further evaluation. Dysphagia is intermittent, but significantly alleviated with eating smaller bites and chewing thoroughly. Intermittnet black stool. Still intermittently taking WelChol. Requesting pantoprazole refill.  3/14/2025: Patient presents for routine follow-up of dysphagia and melena.  EGD with Dr. Desai on 12/127 with large about of liquid food reside in the stomach. Repeat EGD on 2/27/2025 shows no esophageal abnormality to explain dysphagia s/p dilation, gastric erythema, otherwise unremarkable.  Benign gastric biopsies. Patient reports improvement in dysphagia with some throat soreness. He does still note intermittent black stool. Last episode was a couple weeks ago. Still no Pepto or iron. Colonoscopy in 2021 with left sided inflammation. ?No bx on file. Still with diarrhea and states he is now compliant with WelChol. Of note, he switched PCPs - Dr Barbosa with [facility]. He has reduced tobacco from 2 packs daily to 1/2 pack daily. He also has genetic testing pending.   EGD on 2/27/25:  -No endoscopic esophageal abnormality to explain patient's dysphagia.  Esophagus dilated with 51F savory.  No resistance or heme noted. -Z-line irregular, 40 cm from the incisors. -Erythematous mucosa in the stomach.  Biopsied. -Normal examined duodenum. -Mild chemical/reactive gastropathy.  No H. pylori or intestinal metaplasia.  5/23/25: Patient presents for routine follow up for ongoing GI symptoms + black stool. S/p colonoscopy on 5/15 with Dr Desai that shows 8 small TA polyps, sigmoid diverticulosis, internal + external hemorrhoids. Rcommended repeat in 2 years. Recent labs show normal hemoglobin and iron levels, though iron saturation was mildly low. Patient reports carbonated drinks 2-3 times a week. He struggles with eating during the day due to heat, only eating when necessary for pain medication and at dinner. He continues to have abdominal cramping + diarrhea despite antispasmodic + Welchol. Also with bloating.

## 2025-05-27 ENCOUNTER — TELEPHONE ENCOUNTER (OUTPATIENT)
Dept: URBAN - METROPOLITAN AREA CLINIC 54 | Facility: CLINIC | Age: 65
End: 2025-05-27

## 2025-05-28 ENCOUNTER — P2P PATIENT RECORD (OUTPATIENT)
Age: 65
End: 2025-05-28

## 2025-05-28 ENCOUNTER — TELEPHONE ENCOUNTER (OUTPATIENT)
Dept: URBAN - METROPOLITAN AREA CLINIC 54 | Facility: CLINIC | Age: 65
End: 2025-05-28

## 2025-05-30 ENCOUNTER — OFFICE VISIT (OUTPATIENT)
Dept: URBAN - METROPOLITAN AREA CLINIC 54 | Facility: CLINIC | Age: 65
End: 2025-05-30